# Patient Record
Sex: FEMALE | Race: WHITE
[De-identification: names, ages, dates, MRNs, and addresses within clinical notes are randomized per-mention and may not be internally consistent; named-entity substitution may affect disease eponyms.]

---

## 2018-05-28 ENCOUNTER — HOSPITAL ENCOUNTER (EMERGENCY)
Dept: HOSPITAL 97 - ER | Age: 78
Discharge: HOME | End: 2018-05-28
Payer: COMMERCIAL

## 2018-05-28 DIAGNOSIS — R22.9: ICD-10-CM

## 2018-05-28 DIAGNOSIS — R68.84: Primary | ICD-10-CM

## 2018-05-28 DIAGNOSIS — I10: ICD-10-CM

## 2018-05-28 DIAGNOSIS — Z88.2: ICD-10-CM

## 2018-05-28 DIAGNOSIS — Z88.5: ICD-10-CM

## 2018-05-28 DIAGNOSIS — Z88.6: ICD-10-CM

## 2018-05-28 DIAGNOSIS — Z88.8: ICD-10-CM

## 2018-05-28 DIAGNOSIS — Z79.82: ICD-10-CM

## 2018-05-28 LAB
ALBUMIN SERPL BCP-MCNC: 3.9 G/DL (ref 3.2–5.5)
ALP SERPL-CCNC: 66 IU/L (ref 42–121)
ALT SERPL W P-5'-P-CCNC: 15 IU/L (ref 10–60)
AST SERPL W P-5'-P-CCNC: 22 IU/L (ref 10–42)
BUN BLD-MCNC: 13 MG/DL (ref 6–20)
CKMB CREATINE KINASE MB: 1.4 NG/ML (ref 0.3–4)
GLUCOSE SERPLBLD-MCNC: 108 MG/DL (ref 65–120)
HCT VFR BLD CALC: 37 % (ref 36–45)
LYMPHOCYTES # SPEC AUTO: 1.5 K/UL (ref 0.7–4.9)
MAGNESIUM SERPL-MCNC: 2.1 MG/DL (ref 1.8–2.5)
MCH RBC QN AUTO: 29.6 PG (ref 27–35)
MCV RBC: 88.9 FL (ref 80–100)
PMV BLD: 8.3 FL (ref 7.6–11.3)
POTASSIUM SERPL-SCNC: 3.8 MEQ/L (ref 3.6–5)
RBC # BLD: 4.17 M/UL (ref 3.86–4.86)

## 2018-05-28 PROCEDURE — 36415 COLL VENOUS BLD VENIPUNCTURE: CPT

## 2018-05-28 PROCEDURE — 83735 ASSAY OF MAGNESIUM: CPT

## 2018-05-28 PROCEDURE — 82550 ASSAY OF CK (CPK): CPT

## 2018-05-28 PROCEDURE — 82553 CREATINE MB FRACTION: CPT

## 2018-05-28 PROCEDURE — 80076 HEPATIC FUNCTION PANEL: CPT

## 2018-05-28 PROCEDURE — 99285 EMERGENCY DEPT VISIT HI MDM: CPT

## 2018-05-28 PROCEDURE — 93005 ELECTROCARDIOGRAM TRACING: CPT

## 2018-05-28 PROCEDURE — 81003 URINALYSIS AUTO W/O SCOPE: CPT

## 2018-05-28 PROCEDURE — 85025 COMPLETE CBC W/AUTO DIFF WBC: CPT

## 2018-05-28 PROCEDURE — 70487 CT MAXILLOFACIAL W/DYE: CPT

## 2018-05-28 PROCEDURE — 84484 ASSAY OF TROPONIN QUANT: CPT

## 2018-05-28 PROCEDURE — 71045 X-RAY EXAM CHEST 1 VIEW: CPT

## 2018-05-28 PROCEDURE — 76377 3D RENDER W/INTRP POSTPROCES: CPT

## 2018-05-28 PROCEDURE — 80048 BASIC METABOLIC PNL TOTAL CA: CPT

## 2018-05-28 NOTE — ER
Nurse's Notes                                                                                     

 [facility]                                                                

Name: Rhonda Youssef                                                                                 

Age: 77 yrs                                                                                       

Sex: Female                                                                                       

: 1940                                                                                   

MRN: [id number]                                                                                   

Arrival Date: 2018                                                                          

Time: 12:15                                                                                       

Account#: [id number]                                                                            

Bed 16                                                                                            

Private MD: Albaro Swanson V                                                                      

Diagnosis: Jaw pain-Right Lower;Right Lower Facial Swelling                                       

                                                                                                  

Presentation:                                                                                     

                                                                                             

12:27 Presenting complaint: Patient states: " My bottom jaw on the right side started hurting ph  

      on Friday. I started getting nauseous this morning. I thought maybe I had a bad tooth       

      and I called Dr Barajas and he said that I should come here to be evaluated." Pt denies      

      fever or recent tooth pain, also denies vomiting chest pain, or SOB. Transition of          

      care: patient was not received from another setting of care. Onset of symptoms was May      

      28, 2018. Risk Assessment: Do you want to hurt yourself or someone else? Patient            

      reports no desire to harm self or others. Initial Sepsis Screen: Does the patient meet      

      any 2 criteria? No. Patient's initial sepsis screen is negative. Does the patient have      

      a suspected source of infection? No. Patient's initial sepsis screen is negative. Care      

      prior to arrival: None.                                                                     

12:27 Method Of Arrival: Ambulatory                                                           ph  

12:27 Acuity: ARNOLDO 3                                                                           ph  

                                                                                                  

Historical:                                                                                       

- Allergies:                                                                                      

12:32 Levothyroxine Sodium;                                                                   ph  

12:32 Morphine;                                                                               ph  

12:32 NSAIDS;                                                                                 ph  

12:32 Sulfa (Sulfonamide Antibiotics);                                                        ph  

12:32 OTC medications;                                                                        ph  

- Home Meds:                                                                                      

12:32 aspirin 325 mg Oral TbEC 1 tab once daily [Active]; metoprolol tartrate 25 mg Oral tab  ph  

      0.5 tab 2 times per day [Active];                                                           

- PMHx:                                                                                           

12:32 Cataracts; Hypertension;                                                                ph  

- PSHx:                                                                                           

12:32 CATARACTS; Cholecystectomy; ovaries;                                                    ph  

                                                                                                  

- Immunization history:: Adult Immunizations up to date.                                          

- Social history:: Smoking status: Patient/guardian denies using tobacco.                         

- Ebola Screening: : Patient denies travel to an Ebola-affected area in the 21 days               

  before illness onset.                                                                           

                                                                                                  

                                                                                                  

Screenin:05 Abuse screen: Denies threats or abuse. Nutritional screening: No deficits noted.        tw2 

      Tuberculosis screening: No symptoms or risk factors identified. Fall Risk None              

      identified.                                                                                 

                                                                                                  

Assessment:                                                                                       

12:30 General: Appears in no apparent distress. Behavior is calm, cooperative, appropriate    tw2 

      for age. Pain: Complains of pain in right jaw. Neuro: Level of Consciousness is awake,      

      alert, obeys commands, Oriented to person, place, time, situation. Neuro: Speech is         

      normal, Facial symmetry appears normal. Cardiovascular: Denies chest pain, shortness of     

      breath, Heart tones S1 S2 Capillary refill < 3 seconds. Respiratory: Airway is patent       

      Respiratory effort is even, unlabored, Respiratory pattern is regular, symmetrical,         

      Breath sounds are clear bilaterally. GI: No signs and/or symptoms were reported             

      involving the gastrointestinal system. Abdomen is flat, Bowel sounds present X 4 quads.     

      : No signs and/or symptoms were reported regarding the genitourinary system. EENT: No     

      signs and/or symptoms were reported regarding the EENT system. Derm: No signs and/or        

      symptoms reported regarding the dermatologic system. Musculoskeletal: Range of motion:      

      intact in all extremities.                                                                  

13:00 Reassessment: Patient appears in no apparent distress at this time. No changes from     tw2 

      previously documented assessment. Patient and/or family updated on plan of care and         

      expected duration. Pain level reassessed. Patient is alert, oriented x 3, equal             

      unlabored respirations, skin warm/dry/pink.                                                 

14:04 Reassessment: Patient appears in no apparent distress at this time. No changes from     tw2 

      previously documented assessment. Patient and/or family updated on plan of care and         

      expected duration. Pain level reassessed. Patient is alert, oriented x 3, equal             

      unlabored respirations, skin warm/dry/pink.                                                 

15:12 Reassessment: Patient appears in no apparent distress at this time. No changes from     tw2 

      previously documented assessment. Patient and/or family updated on plan of care and         

      expected duration. Pain level reassessed. Patient is alert, oriented x 3, equal             

      unlabored respirations, skin warm/dry/pink.                                                 

15:56 Reassessment: Patient appears in no apparent distress at this time. No changes from     tw2 

      previously documented assessment. Patient and/or family updated on plan of care and         

      expected duration. Pain level reassessed. Patient is alert, oriented x 3, equal             

      unlabored respirations, skin warm/dry/pink.                                                 

                                                                                                  

Vital Signs:                                                                                      

12:30  / 92; Pulse 82; Resp 18; Temp 98.0; Pulse Ox 95% on R/A; Weight 65.77 kg; Height ph  

      5 ft. 4 in. (162.56 cm); Pain 8/10;                                                         

13:00  / 82; Pulse 75; Resp 17; Pulse Ox 96% on R/A;                                    tw2 

14:04  / 83; Pulse 73; Resp 17; Pulse Ox 96% on R/A;                                    tw2 

15:11  / 72; Pulse 81; Resp 21; Pulse Ox 96% on R/A;                                    tw2 

15:56  / 79; Pulse 74; Resp 17; Pulse Ox 97% on R/A;                                    tw2 

12:30 Body Mass Index 24.89 (65.77 kg, 162.56 cm)                                             ph  

                                                                                                  

ED Course:                                                                                        

12:15 Patient arrived in ED.                                                                  mr  

12:15 Albaro Swanson MD is Private Physician.                                                 mr  

12:30 Triage completed.                                                                       ph  

12:33 Arm band placed on.                                                                     ph  

12:34 Nicholas Ayon PA is PHCP.                                                                cp  

12:35 William Alamo MD is Attending Physician.                                              cp  

12:35 Placed in gown. Bed in low position. Cardiac monitor on. Pulse ox on. NIBP on. Warm     tw2 

      blanket given.                                                                              

12:36 Brittnee Rhodes, RN is Primary Nurse.                                                        tw2 

12:45 Inserted saline lock: 20 gauge in left antecubital area, using aseptic technique. Blood tw2 

      collected.                                                                                  

13:21 Radiology exam delayed due to lab results not completed at this time. (BUN/Creatinine). eh  

13:33 Radiology exam delayed due to lab results not completed at this time. (BUN/Creatinine). eh  

13:38 X-ray completed. Portable x-ray completed in exam room. Patient tolerated procedure     jw2 

      well.                                                                                       

13:40 XRAY Chest (1 view) In Process Unspecified.                                             EDMS

14:11 Patient moved to CT via wheelchair.                                                     vm2 

14:14 CT completed. Patient tolerated procedure well. Patient moved back from CT.             vm2 

14:15 CT Facial Bones W/ Con \T\ Mpr In Process Unspecified.                                    EDMS

15:33 Sarmad Peralta DDS is Referral Physician.                                               cp  

15:34 Serena Crouch MD is Referral Physician.                                           cp  

15:35 No provider procedures requiring assistance completed.                                  tw2 

15:55 IV discontinued, intact, bleeding controlled, No redness/swelling at site. Pressure     tw2 

      dressing applied.                                                                           

                                                                                                  

Administered Medications:                                                                         

No medications were administered                                                                  

                                                                                                  

                                                                                                  

Outcome:                                                                                          

15:38 Discharge ordered by MD.                                                                cp  

15:55 Discharged to home ambulatory, with significant other.                                  tw2 

15:55 Condition: stable                                                                           

15:55 Discharge instructions given to patient, significant other, Instructed on discharge         

      instructions, follow up and referral plans. medication usage, Demonstrated                  

      understanding of instructions, follow-up care, medications, Prescriptions given X 1.        

15:57 Patient left the ED.                                                                    tw2 

                                                                                                  

Signatures:                                                                                       

Dispatcher [employer]                           EDMS                                                 

Venessa Velarde                                                   

Rachel, Chana Tapia, RN                      RN   ph                                                   

Nany, Nicholas, PA                         PA   [place] Tovar                                jw2                                                  

Brittnee Rhodes RN                          RN   tw2                                                  

Jazz Day                            [facility]                                                  

                                                                                                  

**************************************************************************************************

## 2018-05-28 NOTE — RAD REPORT
EXAM DESCRIPTION:  Winnie Single View5/28/2018 1:40 pm

 

CLINICAL HISTORY:  Hypertension

 

COMPARISON:  April 2017

 

FINDINGS:   The lungs appear clear of acute infiltrate. The heart is borderline enlarged

 

IMPRESSION:   No acute abnormalities displayed

## 2018-05-28 NOTE — RAD REPORT
EXAM DESCRIPTION:  CTFacial Bones W Con   Mpr5/28/2018 2:15 pm

 

CLINICAL HISTORY:  Jaw pain/facial pain

 

COMPARISON:  None.

 

TECHNIQUE:  Computed axial tomography of the face was obtained. 50 cc Isovue-300 Mr. intravenously.

 

All CT scans are performed using dose optimization technique as appropriate and may include automated
 exposure control or mA/KV adjustment according to patient size.

 

FINDINGS:  A 1 centimeter low to intermediate density area is present within the left aspect of the e
piglottis. The aryepiglottic folds are normal. The remainder of the visualized nasopharynx and oropha
rynx appear unremarkable.

 

The parotid and submandibular glands appear unremarkable.

 

Fluid within the sinuses is not seen. Fluid within the mastoids is not noted.

 

A tooth abscess is not seen.

.

 

IMPRESSION:  1 centimeter low to intermediate density area within the left aspect of the epiglottis m
ay represent a mass. Direct visualization is recommended.

.

## 2018-05-28 NOTE — EDPHYS
Physician Documentation                                                                           

 [facility]                                                                

Name: Rhonda Youssef                                                                                 

Age: 77 yrs                                                                                       

Sex: Female                                                                                       

: 1940                                                                                   

MRN: [id number]                                                                                   

Arrival Date: 2018                                                                          

Time: 12:15                                                                                       

Account#: [id number]                                                                            

Bed 16                                                                                            

Private MD: Albaro Swanson V                                                                      

ED Physician William Alamo                                                                       

HPI:                                                                                              

                                                                                             

12:55 This 77 yrs old  Female presents to ER via Ambulatory with complaints of Jaw   cp  

      Pain.                                                                                       

12:55 The patient presents with pain, swelling right facial cheek area. The problem is        cp  

      located in the right jaw.                                                                   

12:55 Onset: The symptoms/episode began/occurred yesterday. Duration: The symptoms are        cp  

      intermittent, with no pattern. Associated signs and symptoms: Pertinent negatives:          

      dysphagia, fever, inability to eat, vomiting. Patient denies chest pain, denies neck        

      pain, denies difficulty swallowing or food getting stuck.                                   

                                                                                                  

Historical:                                                                                       

- Allergies:                                                                                      

12:32 Levothyroxine Sodium;                                                                   ph  

12:32 Morphine;                                                                               ph  

12:32 NSAIDS;                                                                                 ph  

12:32 Sulfa (Sulfonamide Antibiotics);                                                        ph  

12:32 OTC medications;                                                                        ph  

- Home Meds:                                                                                      

12:32 aspirin 325 mg Oral TbEC 1 tab once daily [Active]; metoprolol tartrate 25 mg Oral tab  ph  

      0.5 tab 2 times per day [Active];                                                           

- PMHx:                                                                                           

12:32 Cataracts; Hypertension;                                                                ph  

- PSHx:                                                                                           

12:32 CATARACTS; Cholecystectomy; ovaries;                                                    ph  

                                                                                                  

- Immunization history:: Adult Immunizations up to date.                                          

- Social history:: Smoking status: Patient/guardian denies using tobacco.                         

- Ebola Screening: : Patient denies travel to an Ebola-affected area in the 21 days               

  before illness onset.                                                                           

                                                                                                  

                                                                                                  

ROS:                                                                                              

13:00 Constitutional: Negative for body aches, chills, fever, poor PO intake.                 cp  

13:00 Eyes: Negative for injury, pain, redness, and discharge.                                cp  

13:00 ENT: Positive for right side jaw pain, Negative for injury or acute deformity, drainage     

      from ear(s), ear pain, sore throat, dental pain, difficulty swallowing, difficulty          

      handling secretions.                                                                        

13:00 Neck: Negative for pain with movement, pain at rest, stiffness, bony tenderness.            

13:00 Cardiovascular: Negative for chest pain, edema, palpitations.                               

13:00 Respiratory: Negative for cough, shortness of breath, wheezing.                             

13:00 Abdomen/GI: Negative for abdominal pain, nausea, vomiting, and diarrhea, anorexia,          

      black/tarry stool, rectal bleeding.                                                         

13:00 Back: Negative for pain at rest, pain with movement, radiated pain.                         

13:00 : Negative for urinary symptoms.                                                          

13:00 Skin: Negative for cellulitis, rash.                                                        

13:00 Neuro: Negative for altered mental status, dizziness, headache, syncope, near syncope,      

      weakness.                                                                                   

13:00 All other systems are negative.                                                             

                                                                                                  

Exam:                                                                                             

13:08 Constitutional: The patient appears in no acute distress, alert, awake,                 cp  

      non-diaphoretic, non-toxic, well developed, well nourished.                                 

13:08 Eyes:  Pupils equal round and reactive to light, extra-ocular motions intact.  Lids and cp  

      lashes normal.  Conjunctiva and sclera are non-icteric and not injected.  Cornea within     

      normal limits.  Periorbital areas with no swelling, redness, or edema.                      

13:08 Head/face: Noted is swelling, that is mild, of the  right facial cheek, Sinus               

      tenderness, is not appreciated.                                                             

13:08 ENT: External ear(s): are unremarkable, Ear canal(s): are normal, clear, TM's: are          

      normal, no evidence of bulging, no erythema, dullness, bilaterally, Nose: is normal,        

      Mouth: Lips: moist, Oral mucosa: pink and intact, moist, Tongue: is normal, abscess, is     

      not appreciated, Posterior pharynx: Airway: no evidence of obstruction, patent,             

      Tonsils: are normal in appearance, Uvula: midline, non-edematous, no erythema,              

      swelling, is not appreciated, erythema, is not appreciated, exudate, is not                 

      appreciated, Dental exam: abscess, is not appreciated, fractured teeth are noted, not       

      appreciated, gum swelling, not appreciated, pain, is not appreciated, Voice: is normal.     

13:08 Neck: External neck: is normal, ROM/movement: is normal, is supple, without pain, no        

      range of motions limitations, no meningismus, no nuchal rigidity.                           

13:08 Chest/axilla: Inspection: normal, Palpation: is normal, no crepitus, no tenderness.         

13:08 Cardiovascular: Rate: normal, Rhythm: regular, Edema: is not appreciated, JVD: is not       

      appreciated.                                                                                

13:08 Respiratory: the patient does not display signs of respiratory distress,  Respirations:     

      normal, no use of accessory muscles, no retractions, no splinting, no tachypnea,            

      labored breathing, is not present, Breath sounds: are clear throughout, no decreased        

      breath sounds, no stridor, no wheezing.                                                     

13:08 Abdomen/GI: Inspection: abdomen appears normal, Palpation: abdomen is soft and              

      non-tender, in all quadrants, rebound tenderness, is not appreciated, voluntary             

      guarding, is not appreciated, involuntary guarding, is not appreciated.                     

13:08 Back: pain, is absent, ROM is normal.                                                       

13:08 Skin: cellulitis, is not appreciated, no rash present.                                      

13:08 Neuro: Orientation: to person, place \T\ time. Mentation: lucid, able to follow commands,   

      Motor: moves all fours, strength is normal, Sensation: no obvious gross deficits.           

14:05 ECG was reviewed by the Attending Physician.                                            cp  

                                                                                                  

Vital Signs:                                                                                      

12:30  / 92; Pulse 82; Resp 18; Temp 98.0; Pulse Ox 95% on R/A; Weight 65.77 kg; Height ph  

      5 ft. 4 in. (162.56 cm); Pain 8/10;                                                         

13:00  / 82; Pulse 75; Resp 17; Pulse Ox 96% on R/A;                                    tw2 

14:04  / 83; Pulse 73; Resp 17; Pulse Ox 96% on R/A;                                    tw2 

15:11  / 72; Pulse 81; Resp 21; Pulse Ox 96% on R/A;                                    tw2 

15:56  / 79; Pulse 74; Resp 17; Pulse Ox 97% on R/A;                                    tw2 

12:30 Body Mass Index 24.89 (65.77 kg, 162.56 cm)                                             ph  

                                                                                                  

MDM:                                                                                              

12:35 Patient medically screened.                                                             cp  

13:00 Differential diagnosis: dental caries, dental abscess, pericoronitis, osteomyelitis,    cp  

      facial cellulitis, acute MI, angina.                                                        

15:33 Data reviewed: vital signs, nurses notes, lab test result(s), radiologic studies, CT    cp  

      scan.                                                                                       

15:35 Test interpretation: by ED physician or midlevel provider: ECG, plain radiologic        cp  

      studies.                                                                                    

15:35 Counseling: I had a detailed discussion with the patient and/or guardian regarding: the cp  

      historical points, exam findings, and any diagnostic results supporting the                 

      discharge/admit diagnosis, lab results, radiology results, the need for outpatient          

      follow up, an ENT specialist, maxillary/facial, to return to the emergency department       

      if symptoms worsen or persist or if there are any questions or concerns that arise at       

      home. Response to treatment: the patient's symptoms have mildly improved after              

      treatment, and as a result, I will discharge patient. ED course: VSS. Discussed results     

      of CT that showed concern for epiglottis mass. Patient with no signs of respiratory         

      distress and is tolerating po foods/fluids. Cardiac work-up negative. Will discharge to     

      home for continued monitoring.                                                              

                                                                                                  

                                                                                             

12:51 Order name: Basic Metabolic Panel                                                         

                                                                                             

12:51 Order name: CBC with Diff; Complete Time: 13:56                                         cp  

                                                                                             

12:51 Order name: Ckmb; Complete Time: 13:56                                                  cp  

                                                                                             

12:51 Order name: CPK; Complete Time: 13:56                                                   cp  

                                                                                             

12:51 Order name: LFT's; Complete Time: 13:56                                                 cp  

                                                                                             

12:51 Order name: Magnesium; Complete Time: 13:56                                             cp  

                                                                                             

12:51 Order name: Troponin (emerg Dept Use Only); Complete Time: 13:56                        cp  

                                                                                             

13:57 Interpretation: TROPED < 0.03; Reviewed.                                                  

                                                                                             

12:51 Order name: XRAY Chest (1 view); Complete Time: 14:53                                   cp  

                                                                                             

12:51 Order name: EKG; Complete Time: 12:52                                                   cp  

                                                                                             

12:51 Order name: Cardiac monitoring; Complete Time: 13:51                                    cp  

                                                                                             

12:51 Order name: EKG - Nurse/Tech; Complete Time: 13:51                                      cp  

                                                                                             

12:51 Order name: CT Facial Bones W/ Con \T\ Mpr                                                

                                                                                             

12:52 Order name: Basic Metabolic Panel; Complete Time: 13:56                                 EDMS

                                                                                             

13:56 Interpretation: Normal except: GFR 76.                                                  cp  

                                                                                             

15:19 Order name: Urine Dipstick--Ancillary (enter results)                                     

                                                                                             

12:51 Order name: IV Saline Lock; Complete Time: 13:51                                        cp  

                                                                                             

12:51 Order name: Labs collected and sent; Complete Time: 13:18                               cp  

                                                                                             

12:51 Order name: O2 Per Protocol; Complete Time: 13:28                                       cp  

                                                                                             

12:51 Order name: O2 Sat Monitoring; Complete Time: 13:28                                     cp  

                                                                                             

12:51 Order name: Urine Dipstick-Ancillary (obtain specimen); Complete Time: 15:34            cp  

                                                                                                  

EC:05 Rate is 80 beats/min. Rhythm is regular. VT interval is normal. QRS interval is normal. cp  

      QT interval is normal. No ST changes noted. Interpreted by me. Reviewed by me.              

                                                                                                  

Administered Medications:                                                                         

No medications were administered                                                                  

                                                                                                  

                                                                                                  

Disposition:                                                                                      

                                                                                             

07:41 Co-signature as Attending Physician, William Alamo MD.                                    

                                                                                                  

Disposition:                                                                                      

18 15:38 Discharged to Home. Impression: Jaw pain - Right Lower, Right Lower Facial         

  Swelling.                                                                                       

- Condition is Stable.                                                                            

- Discharge Instructions: Cellulitis.                                                             

- Prescriptions for Clindamycin HCl 300 mg Oral Capsule - take 1 capsule by ORAL route            

  every 6 hours for 10 days; 40 capsule.                                                          

- Medication Reconciliation Form, Thank You Letter, Antibiotic Education, Prescription            

  Opioid Use form.                                                                                

- Follow up: Sarmad Peralta DDS; When: 1 - 2 days; Reason: right jaw pain. Follow up:             

  Serena Crouch MD; When: 2 - 3 days; Reason: CT results showing possible left              

  epiglottis mass.                                                                                

- Problem is new.                                                                                 

- Symptoms are unchanged.                                                                         

                                                                                                  

                                                                                                  

                                                                                                  

Signatures:                                                                                       

Dispatcher [employer]                           EDChana Ray RN                      RN                                                      

Nicholas Ayon PA                         PA   Brittnee Ellison RN                          RN   tw2                                                  

William Alamo MD MD                                                      

                                                                                                  

Corrections: (The following items were deleted from the chart)                                    

                                                                                             

15:57 15:38 2018 15:38 Discharged to Home. Impression: Jaw pain - Right Lower; Right    tw2 

      Lower Facial Swelling. Condition is Stable. Forms are Medication Reconciliation Form,       

      Thank You Letter, Antibiotic Education, Prescription Opioid Use. Follow up: Sarmad Peralta; When: 1 - 2 days; Reason: right jaw pain. Follow up: Serena Crouch; When: 2      

      - 3 days; Reason: CT results showing possible left epiglottis mass. Problem is new.         

      Symptoms are unchanged. cp                                                                  

                                                                                                  

**************************************************************************************************

## 2018-05-29 NOTE — EKG
Test Date:    2018-05-28               Test Time:    13:59:01

Technician:   ANIYA                                    

                                                     

MEASUREMENT RESULTS:                                       

Intervals:                                           

Rate:         80                                     

MO:           184                                    

QRSD:         88                                     

QT:           402                                    

QTc:          463                                    

Axis:                                                

P:            68                                     

MO:           184                                    

QRS:          1                                      

T:            36                                     

                                                     

INTERPRETIVE STATEMENTS:                                       

                                                     

Sinus rhythm with premature atrial complexes

Otherwise normal ECG

Compared to ECG 04/13/2011 23:27:56

Atrial premature complex(es) now present



Electronically Signed On 05-29-18 06:31:37 CDT by Gilbert Miller